# Patient Record
Sex: FEMALE | Race: WHITE | NOT HISPANIC OR LATINO | Employment: FULL TIME | URBAN - METROPOLITAN AREA
[De-identification: names, ages, dates, MRNs, and addresses within clinical notes are randomized per-mention and may not be internally consistent; named-entity substitution may affect disease eponyms.]

---
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
## 2018-05-08 DIAGNOSIS — N94.6 DYSMENORRHEA: ICD-10-CM

## 2018-05-08 DIAGNOSIS — N93.8 DUB (DYSFUNCTIONAL UTERINE BLEEDING): ICD-10-CM

## 2018-05-08 RX ORDER — LEVONORGESTREL AND ETHINYL ESTRADIOL 0.15-0.03
KIT ORAL
Qty: 84 TABLET | Refills: 3 | OUTPATIENT
Start: 2018-05-08

## 2018-06-15 ENCOUNTER — OFFICE VISIT (OUTPATIENT)
Dept: OBSTETRICS AND GYNECOLOGY | Facility: CLINIC | Age: 18
End: 2018-06-15
Payer: COMMERCIAL

## 2018-06-15 VITALS
BODY MASS INDEX: 44.16 KG/M2 | HEIGHT: 66 IN | HEART RATE: 95 BPM | SYSTOLIC BLOOD PRESSURE: 112 MMHG | WEIGHT: 274.81 LBS | DIASTOLIC BLOOD PRESSURE: 76 MMHG

## 2018-06-15 DIAGNOSIS — N94.6 DYSMENORRHEA: Primary | ICD-10-CM

## 2018-06-15 DIAGNOSIS — N93.8 DUB (DYSFUNCTIONAL UTERINE BLEEDING): ICD-10-CM

## 2018-06-15 PROCEDURE — 99999 PR PBB SHADOW E&M-EST. PATIENT-LVL III: CPT | Mod: PBBFAC,,, | Performed by: OBSTETRICS & GYNECOLOGY

## 2018-06-15 PROCEDURE — 99213 OFFICE O/P EST LOW 20 MIN: CPT | Mod: S$GLB,,, | Performed by: OBSTETRICS & GYNECOLOGY

## 2018-06-15 PROCEDURE — 3008F BODY MASS INDEX DOCD: CPT | Mod: CPTII,S$GLB,, | Performed by: OBSTETRICS & GYNECOLOGY

## 2018-06-15 RX ORDER — SUMATRIPTAN 50 MG/1
TABLET, FILM COATED ORAL
Refills: 11 | COMMUNITY
Start: 2018-06-01 | End: 2022-02-09 | Stop reason: ALTCHOICE

## 2018-06-15 RX ORDER — BUTALBITAL, ACETAMINOPHEN AND CAFFEINE 50; 325; 40 MG/1; MG/1; MG/1
1 TABLET ORAL EVERY 4 HOURS PRN
Refills: 0 | COMMUNITY
Start: 2018-03-16 | End: 2022-02-09 | Stop reason: ALTCHOICE

## 2018-06-15 RX ORDER — AMITRIPTYLINE HYDROCHLORIDE 10 MG/1
10 TABLET, FILM COATED ORAL DAILY
Refills: 11 | COMMUNITY
Start: 2018-06-01 | End: 2022-09-20

## 2018-06-15 RX ORDER — LEVONORGESTREL AND ETHINYL ESTRADIOL 0.15-0.03
KIT ORAL
Qty: 84 TABLET | Refills: 3 | Status: SHIPPED | OUTPATIENT
Start: 2018-06-15 | End: 2019-04-06 | Stop reason: SDUPTHER

## 2018-06-15 NOTE — PROGRESS NOTES
"Subjective:    Patient ID: Uma Pascual is a 17 y.o. y.o. female.     Chief Complaint:   Chief Complaint   Patient presents with    Dysmenorrhea       History of Present Illness:  Uma presents today for follow-up of oral contraceptives for control of dysmenorrhea. She states her menses are regular and last 5-6 days. Bleeding is light and she reports only mild cramps which are relieved with "Aleve. She denies headaches, dizziness, breast tenderness.      Menstrual History:   Patient's last menstrual period was 2018 (exact date)..     OB History      Para Term  AB Living    0 0 0          SAB TAB Ectopic Multiple Live Births                         Review of Systems   Constitutional: Negative for activity change, appetite change, chills, diaphoresis, fatigue, fever and unexpected weight change.   Respiratory: Negative for cough, shortness of breath and wheezing.    Cardiovascular: Negative for chest pain, palpitations and leg swelling.   Gastrointestinal: Negative for abdominal pain, blood in stool, constipation, diarrhea, nausea and vomiting.   Endocrine: Negative for diabetes, hair loss, hot flashes, hyperthyroidism and hypothyroidism.   Genitourinary: Positive for dysmenorrhea. Negative for decreased libido, dyspareunia, dysuria, flank pain, frequency, genital sores, hematuria, menorrhagia, menstrual problem, pelvic pain, urgency, vaginal bleeding, vaginal discharge, vaginal pain, urinary incontinence, postcoital bleeding and vaginal odor.   Hematological: Negative for adenopathy. Does not bruise/bleed easily.   Psychiatric/Behavioral: Negative for sleep disturbance. The patient is not nervous/anxious.    Breast: Negative for breast pain and nipple discharge        Objective:    Vital Signs:  Vitals:    06/15/18 1108   BP: 112/76   Pulse: 95       Physical Exam:deferred      Assessment:      1. Dysmenorrhea    2. DUB (dysfunctional uterine bleeding)          Plan:  "       Dysmenorrhea  -     levonorgestrel-ethinyl estradiol (LEVORA-28) 0.15-0.03 mg per tablet; TAKE 1 TABLET ONE TIME DAILY  Dispense: 84 tablet; Refill: 3    The use of the oral contraceptive has been fully discussed with the patient including the proper method to initiate and continue the pills, the need for regular compliance to ensure adequate contraceptive effect, the physiology which make the pill effective, the instructions for what to do in event of a missed pill, and warnings about anticipated minor side effects such as breakthrough spotting, nausea, breast tenderness, weight changes, acne, headaches, etc.  She has been told of the more serious potential side effects such as MI, stroke, and deep vein thrombosis, all of which are very unlikely.  She has been asked to report any signs of such serious problems immediately.  She should back up the pill with a condom during any cycle in which antibiotics are prescribed, and during the first cycle as well. She has been clearly reminded that OCP's cannot protect her against diseases such as HIV and other STD's, and the need for additional protection, such as a condom, to prevent exposure to sexually transmitted diseases has been stressed. She has voiced her understadning and acceptance.    Counseling required 15 minute minutes face to face time to explain to her the various options available.

## 2019-04-06 DIAGNOSIS — N94.6 DYSMENORRHEA: ICD-10-CM

## 2019-04-06 DIAGNOSIS — N93.8 DUB (DYSFUNCTIONAL UTERINE BLEEDING): ICD-10-CM

## 2019-04-07 RX ORDER — LEVONORGESTREL AND ETHINYL ESTRADIOL 0.15-0.03
KIT ORAL
Qty: 84 TABLET | Refills: 3 | Status: SHIPPED | OUTPATIENT
Start: 2019-04-07 | End: 2020-01-30

## 2019-04-30 ENCOUNTER — TELEPHONE (OUTPATIENT)
Dept: OBSTETRICS AND GYNECOLOGY | Facility: CLINIC | Age: 19
End: 2019-04-30

## 2019-04-30 NOTE — TELEPHONE ENCOUNTER
----- Message from Elodia Rucker sent at 4/30/2019  1:03 PM CDT -----  Contact: ayden Pascual  MRN: [id number]  Home Phone      [phone]  Work Phone      Not on file.  Mobile          [phone]    Patient Care Team:  Tim Rivero MD as PCP - General (Family Medicine)  Karel Castle MD as Obstetrician (Gynecology)  OB? No  What phone number can you be reached at? [phone]  Message:  Pt would like to know if the recent change of her bc is supposed to be changed. Please advise, thanks.

## 2019-05-01 NOTE — TELEPHONE ENCOUNTER
----- Message from Jenny Rucker MA sent at 5/1/2019  1:36 PM CDT -----  Contact: ayden Pascual  MRN: [id number]  Home Phone      [phone]  Work Phone      Not on file.  Mobile          [phone]    Patient Care Team:  Tim Rivero MD as PCP - General (Family Medicine)  Karel Castle MD as Obstetrician (Gynecology)  OB? No  What phone number can you be reached at?  [phone]  Message:   Returning call to clinic.

## 2019-05-01 NOTE — TELEPHONE ENCOUNTER
----- Message from Caridad Bob sent at 5/1/2019 11:10 AM CDT -----  Contact: Self  Uma Pascual  MRN: [id number]  Home Phone      [phone]  Work Phone      Not on file.  Mobile          [phone]    Patient Care Team:  Tim Rivero MD as PCP - General (Family Medicine)  Karel Castle MD as Obstetrician (Gynecology)  OB? No  What phone number can you be reached at? [phone]  Message:   Pt returning call. Please advise. Pt has class again at 3335

## 2020-01-30 DIAGNOSIS — N93.8 DUB (DYSFUNCTIONAL UTERINE BLEEDING): ICD-10-CM

## 2020-01-30 DIAGNOSIS — N94.6 DYSMENORRHEA: ICD-10-CM

## 2020-01-30 RX ORDER — LEVONORGESTREL AND ETHINYL ESTRADIOL 0.15-0.03
KIT ORAL
Qty: 84 TABLET | Refills: 3 | Status: SHIPPED | OUTPATIENT
Start: 2020-01-30 | End: 2022-02-09 | Stop reason: ALTCHOICE